# Patient Record
Sex: FEMALE | Race: WHITE | NOT HISPANIC OR LATINO | URBAN - METROPOLITAN AREA
[De-identification: names, ages, dates, MRNs, and addresses within clinical notes are randomized per-mention and may not be internally consistent; named-entity substitution may affect disease eponyms.]

---

## 2020-05-30 ENCOUNTER — EMERGENCY (EMERGENCY)
Facility: HOSPITAL | Age: 30
LOS: 1 days | Discharge: HOME | End: 2020-05-30
Attending: EMERGENCY MEDICINE
Payer: MEDICAID

## 2020-05-30 VITALS
TEMPERATURE: 99 F | RESPIRATION RATE: 16 BRPM | OXYGEN SATURATION: 99 % | HEIGHT: 67 IN | DIASTOLIC BLOOD PRESSURE: 76 MMHG | HEART RATE: 67 BPM | SYSTOLIC BLOOD PRESSURE: 115 MMHG | WEIGHT: 149.91 LBS

## 2020-05-30 PROCEDURE — 73610 X-RAY EXAM OF ANKLE: CPT

## 2020-05-30 PROCEDURE — 99283 EMERGENCY DEPT VISIT LOW MDM: CPT

## 2020-05-30 PROCEDURE — 73610 X-RAY EXAM OF ANKLE: CPT | Mod: 26,RT

## 2020-05-30 RX ORDER — ACETAMINOPHEN 500 MG
975 TABLET ORAL ONCE
Refills: 0 | Status: COMPLETED | OUTPATIENT
Start: 2020-05-30 | End: 2020-05-30

## 2020-05-30 RX ADMIN — Medication 975 MILLIGRAM(S): at 15:32

## 2020-05-30 NOTE — ED PROVIDER NOTE - NSFOLLOWUPCLINICS_GEN_ALL_ED_FT
[facility] Orthopedic [facility]  Orthopedic Surgery  [address], 3rd & 4th [place] [address]  Phone: [phone]  Fax:   Follow Up Time:     [facility]  Orthopedic Surgery  [address] [address]  Phone: [phone]  Fax:   Follow Up Time:

## 2020-05-30 NOTE — ED PROVIDER NOTE - ATTENDING CONTRIBUTION TO CARE
29y female pmh ankle sprain, comes to ed complains of ankle pain sp twisting same, pt had been working out. and complains of pain rt ankle lat, normocephalic atraumatic neck supple chest clear anterior & posterior abd soft +bs no mass guarding, cv no rubs, gallops or murmurs neuro no focal defects., MSK rt ankle lat swelling tender mod/severe rt malleous, distal neuro vasc intact  Dipak Gooden MD, Facep

## 2020-05-30 NOTE — ED ADULT NURSE NOTE - OBJECTIVE STATEMENT
Patient presents to Ed with c/o rt ankle injury. Patient reports injuring her rt ankle today while exercising. Patient  denies any other injuries, patient awaiting x-ray, Tylenol given per Md order.

## 2020-05-30 NOTE — ED PROVIDER NOTE - OBJECTIVE STATEMENT
30yo F with hx of ankle sprain, no other pmh presents s/p ankle injury with ankle pain. Was doing "hiney" work out where she jumps up and down. When she landed, she hurt her R ankle and was unable to bear weight on the R. no neurovascular changes.

## 2020-05-30 NOTE — ED PROVIDER NOTE - NS ED ROS FT
ROS: denies HA, weakness, dizziness, fevers/chills, nausea/vomiting, chest pain, SOB, diaphoresis, abdominal pain, diarrhea, neuro deficits, dysuria/hematuria, rash    +R ankle pain

## 2020-05-30 NOTE — ED PROVIDER NOTE - PATIENT PORTAL LINK FT
You can access the FollowMyHealth Patient Portal offered by [facility] by registering at the following website: [email or web address]. By joining [employer]’s FollowMyHealth portal, you will also be able to view your health information using other applications (apps) compatible with our system.

## 2020-05-30 NOTE — ED ADULT TRIAGE NOTE - AS HEIGHT TYPE
Biometrics completed.    Results reviewed with a Registered Nurse; understanding of results and   educational materials was verbalized.  
stated

## 2020-05-30 NOTE — ED PROVIDER NOTE - NSFOLLOWUPINSTRUCTIONS_ED_ALL_ED_FT
You were seen in the ER for ankle sprain.  Xray did not show any fractures.  Return to ER for life threatening emergencies.  Follow up with your doctor.

## 2020-06-01 NOTE — ED POST DISCHARGE NOTE - DETAILS
Patient placed in air cast and given crutches as well as ortho follow-up. Unclear if patient informed of discrepancy read, as it was released less than one hour after initial read. Attempt to contact patient to ensure remaining nonweightbearing until follow-up with orthopedics. Left VM for patient to call back. -Ifeoma Solis PA-C 6/7/2020 - Called and spoke with patient. Still wearing air cast but reports persistent ankle pain. Informed of results, advised to use crutches and f/u with orthopedic. - Ashley Nicole PA-C

## 2020-06-01 NOTE — ED POST DISCHARGE NOTE - RESULT SUMMARY
Xray ankle: Small ossific fragments projecting at the distal aspect of the fibula are of uncertain chronicity.

## 2020-06-28 ENCOUNTER — EMERGENCY (EMERGENCY)
Facility: HOSPITAL | Age: 30
LOS: 1 days | Discharge: HOME | End: 2020-06-28
Attending: EMERGENCY MEDICINE
Payer: MEDICAID

## 2020-06-28 VITALS
HEART RATE: 70 BPM | OXYGEN SATURATION: 100 % | RESPIRATION RATE: 15 BRPM | DIASTOLIC BLOOD PRESSURE: 77 MMHG | TEMPERATURE: 98 F | SYSTOLIC BLOOD PRESSURE: 122 MMHG

## 2020-06-28 VITALS
RESPIRATION RATE: 18 BRPM | TEMPERATURE: 99 F | HEIGHT: 67 IN | DIASTOLIC BLOOD PRESSURE: 77 MMHG | WEIGHT: 141.98 LBS | SYSTOLIC BLOOD PRESSURE: 116 MMHG | OXYGEN SATURATION: 99 % | HEART RATE: 91 BPM

## 2020-06-28 LAB
ALBUMIN SERPL ELPH-MCNC: 3.6 G/DL — SIGNIFICANT CHANGE UP (ref 3.3–5)
ALP SERPL-CCNC: 66 U/L — SIGNIFICANT CHANGE UP (ref 40–120)
ALT FLD-CCNC: 10 U/L — SIGNIFICANT CHANGE UP (ref 10–45)
ANION GAP SERPL CALC-SCNC: 9 MMOL/L — SIGNIFICANT CHANGE UP (ref 5–17)
APPEARANCE UR: ABNORMAL
AST SERPL-CCNC: 19 U/L — SIGNIFICANT CHANGE UP (ref 10–40)
BACTERIA # UR AUTO: ABNORMAL
BASOPHILS # BLD AUTO: 0.02 K/UL — SIGNIFICANT CHANGE UP (ref 0–0.2)
BASOPHILS NFR BLD AUTO: 0.3 % — SIGNIFICANT CHANGE UP (ref 0–2)
BILIRUB SERPL-MCNC: 0.2 MG/DL — SIGNIFICANT CHANGE UP (ref 0.2–1.2)
BILIRUB UR-MCNC: NEGATIVE — SIGNIFICANT CHANGE UP
BUN SERPL-MCNC: 8 MG/DL — SIGNIFICANT CHANGE UP (ref 7–23)
CALCIUM SERPL-MCNC: 8.3 MG/DL — LOW (ref 8.4–10.5)
CHLORIDE SERPL-SCNC: 103 MMOL/L — SIGNIFICANT CHANGE UP (ref 96–108)
CO2 SERPL-SCNC: 23 MMOL/L — SIGNIFICANT CHANGE UP (ref 22–31)
COLOR SPEC: YELLOW — SIGNIFICANT CHANGE UP
CREAT SERPL-MCNC: 0.66 MG/DL — SIGNIFICANT CHANGE UP (ref 0.5–1.3)
DIFF PNL FLD: NEGATIVE — SIGNIFICANT CHANGE UP
EOSINOPHIL # BLD AUTO: 0.04 K/UL — SIGNIFICANT CHANGE UP (ref 0–0.5)
EOSINOPHIL NFR BLD AUTO: 0.5 % — SIGNIFICANT CHANGE UP (ref 0–6)
EPI CELLS # UR: 5 /HPF — SIGNIFICANT CHANGE UP
GLUCOSE SERPL-MCNC: 95 MG/DL — SIGNIFICANT CHANGE UP (ref 70–99)
GLUCOSE UR QL: NEGATIVE — SIGNIFICANT CHANGE UP
HCG SERPL-ACNC: <2 MIU/ML — SIGNIFICANT CHANGE UP
HCT VFR BLD CALC: 36.5 % — SIGNIFICANT CHANGE UP (ref 34.5–45)
HGB BLD-MCNC: 11.9 G/DL — SIGNIFICANT CHANGE UP (ref 11.5–15.5)
HYALINE CASTS # UR AUTO: 1 /LPF — SIGNIFICANT CHANGE UP (ref 0–2)
IMM GRANULOCYTES NFR BLD AUTO: 0.4 % — SIGNIFICANT CHANGE UP (ref 0–1.5)
KETONES UR-MCNC: NEGATIVE — SIGNIFICANT CHANGE UP
LEUKOCYTE ESTERASE UR-ACNC: NEGATIVE — SIGNIFICANT CHANGE UP
LIDOCAIN IGE QN: 19 U/L — SIGNIFICANT CHANGE UP (ref 7–60)
LYMPHOCYTES # BLD AUTO: 2.17 K/UL — SIGNIFICANT CHANGE UP (ref 1–3.3)
LYMPHOCYTES # BLD AUTO: 27.5 % — SIGNIFICANT CHANGE UP (ref 13–44)
MCHC RBC-ENTMCNC: 28.9 PG — SIGNIFICANT CHANGE UP (ref 27–34)
MCHC RBC-ENTMCNC: 32.6 GM/DL — SIGNIFICANT CHANGE UP (ref 32–36)
MCV RBC AUTO: 88.6 FL — SIGNIFICANT CHANGE UP (ref 80–100)
MONOCYTES # BLD AUTO: 0.87 K/UL — SIGNIFICANT CHANGE UP (ref 0–0.9)
MONOCYTES NFR BLD AUTO: 11 % — SIGNIFICANT CHANGE UP (ref 2–14)
NEUTROPHILS # BLD AUTO: 4.77 K/UL — SIGNIFICANT CHANGE UP (ref 1.8–7.4)
NEUTROPHILS NFR BLD AUTO: 60.3 % — SIGNIFICANT CHANGE UP (ref 43–77)
NITRITE UR-MCNC: NEGATIVE — SIGNIFICANT CHANGE UP
NRBC # BLD: 0 /100 WBCS — SIGNIFICANT CHANGE UP (ref 0–0)
PH UR: 6 — SIGNIFICANT CHANGE UP (ref 5–8)
PLATELET # BLD AUTO: 225 K/UL — SIGNIFICANT CHANGE UP (ref 150–400)
POTASSIUM SERPL-MCNC: 4.1 MMOL/L — SIGNIFICANT CHANGE UP (ref 3.5–5.3)
POTASSIUM SERPL-SCNC: 4.1 MMOL/L — SIGNIFICANT CHANGE UP (ref 3.5–5.3)
PROT SERPL-MCNC: 7.1 G/DL — SIGNIFICANT CHANGE UP (ref 6–8.3)
PROT UR-MCNC: ABNORMAL
RBC # BLD: 4.12 M/UL — SIGNIFICANT CHANGE UP (ref 3.8–5.2)
RBC # FLD: 11.7 % — SIGNIFICANT CHANGE UP (ref 10.3–14.5)
RBC CASTS # UR COMP ASSIST: 3 /HPF — SIGNIFICANT CHANGE UP (ref 0–4)
SODIUM SERPL-SCNC: 135 MMOL/L — SIGNIFICANT CHANGE UP (ref 135–145)
SP GR SPEC: 1.02 — SIGNIFICANT CHANGE UP (ref 1.01–1.02)
UROBILINOGEN FLD QL: NEGATIVE — SIGNIFICANT CHANGE UP
WBC # BLD: 7.9 K/UL — SIGNIFICANT CHANGE UP (ref 3.8–10.5)
WBC # FLD AUTO: 7.9 K/UL — SIGNIFICANT CHANGE UP (ref 3.8–10.5)
WBC UR QL: 2 /HPF — SIGNIFICANT CHANGE UP (ref 0–5)

## 2020-06-28 PROCEDURE — 85027 COMPLETE CBC AUTOMATED: CPT

## 2020-06-28 PROCEDURE — 83690 ASSAY OF LIPASE: CPT

## 2020-06-28 PROCEDURE — 84702 CHORIONIC GONADOTROPIN TEST: CPT

## 2020-06-28 PROCEDURE — 81001 URINALYSIS AUTO W/SCOPE: CPT

## 2020-06-28 PROCEDURE — 99285 EMERGENCY DEPT VISIT HI MDM: CPT

## 2020-06-28 PROCEDURE — 74177 CT ABD & PELVIS W/CONTRAST: CPT

## 2020-06-28 PROCEDURE — 80053 COMPREHEN METABOLIC PANEL: CPT

## 2020-06-28 PROCEDURE — 74177 CT ABD & PELVIS W/CONTRAST: CPT | Mod: 26

## 2020-06-28 PROCEDURE — 99284 EMERGENCY DEPT VISIT MOD MDM: CPT | Mod: 25

## 2020-06-28 PROCEDURE — 96374 THER/PROPH/DIAG INJ IV PUSH: CPT | Mod: XU

## 2020-06-28 PROCEDURE — 96361 HYDRATE IV INFUSION ADD-ON: CPT

## 2020-06-28 RX ORDER — MULTIVIT WITH MIN/MFOLATE/K2 340-15/3 G
1 POWDER (GRAM) ORAL ONCE
Refills: 0 | Status: COMPLETED | OUTPATIENT
Start: 2020-06-28 | End: 2020-06-28

## 2020-06-28 RX ORDER — SODIUM CHLORIDE 9 MG/ML
1000 INJECTION INTRAMUSCULAR; INTRAVENOUS; SUBCUTANEOUS ONCE
Refills: 0 | Status: COMPLETED | OUTPATIENT
Start: 2020-06-28 | End: 2020-06-28

## 2020-06-28 RX ORDER — ONDANSETRON 8 MG/1
4 TABLET, FILM COATED ORAL ONCE
Refills: 0 | Status: COMPLETED | OUTPATIENT
Start: 2020-06-28 | End: 2020-06-28

## 2020-06-28 RX ADMIN — SODIUM CHLORIDE 1000 MILLILITER(S): 9 INJECTION INTRAMUSCULAR; INTRAVENOUS; SUBCUTANEOUS at 05:54

## 2020-06-28 RX ADMIN — Medication 1 BOTTLE: at 07:10

## 2020-06-28 RX ADMIN — SODIUM CHLORIDE 1000 MILLILITER(S): 9 INJECTION INTRAMUSCULAR; INTRAVENOUS; SUBCUTANEOUS at 05:15

## 2020-06-28 RX ADMIN — ONDANSETRON 4 MILLIGRAM(S): 8 TABLET, FILM COATED ORAL at 05:14

## 2020-06-28 NOTE — ED PROVIDER NOTE - NS ED ROS FT
ROS:  GENERAL: +fever   EYES: no change in vision  HEENT: no trouble swallowing, no trouble speaking  CARDIAC: no chest pain  PULMONARY: no cough, no shortness of breath  GI: +abd pain and nausea. no vomiting, no diarrhea, no constipation  : No dysuria, no frequency, no change in appearance, or odor of urine  SKIN: no rashes  NEURO: no headache, no weakness  MSK: No joint pain    Eh Friere PGY2

## 2020-06-28 NOTE — ED PROVIDER NOTE - NSFOLLOWUPINSTRUCTIONS_ED_ALL_ED_FT
Follow up with your PCP in 24-48 hours.   Drink 1 bottle of mag citrate upon returning home.   Return to the ER if you develop any new or worsening symptoms such as fever, chest pain, shortness of breath, numbness, weakness, abdominal pain, nausea, vomiting, or visual changes. Follow up with your PCP in 24-48 hours. Please bring copies of your results with you.   Drink 1 bottle of mag citrate upon returning home.   Return to the ER if you develop any new or worsening symptoms such as fever, chest pain, shortness of breath, numbness, weakness, abdominal pain, nausea, vomiting, or visual changes.  - Lab and imaging results, if performed, were discussed with you along with your discharge diagnosis    - Continue all prescribed medications    - Take tylenol as directed as needed for pain    - Rest and keep yourself hydrated with fluids

## 2020-06-28 NOTE — ED ADULT TRIAGE NOTE - CHIEF COMPLAINT QUOTE
abdominal pain x 3 days; no bm; tried Miralax; nausea; feels bloated; temp 100.5 at home with chills

## 2020-06-28 NOTE — ED PROVIDER NOTE - PROGRESS NOTE DETAILS
Amanda COLEMAN (PGY-1): Patient signed out to me, pending CTr, Mag citrate, dispo. Resting comfortably in bed, not endorsing pain at this time. Will continue to monitor. Amanda COLEMAN (PGY-1): Results of CT discussed with patient, labs wnl, no clinical suspicion for autoimmune pancreatitis as pt's pain in LLQ, patient states she will f/u with her PCP.

## 2020-06-28 NOTE — ED PROVIDER NOTE - OBJECTIVE STATEMENT
29F with no PMH/PSH p/w constant left-sided abdominal pain and constipation x 3 days. Associated with nausea and subjective fevers (100.5 at home). +flatus. Denies any other symptoms including vomiting, cough, chest pain, SOB, urinary symptoms. Went to an urgent care today where she was tested for COVID but states they did not evaluate her abdominal pain. LMP 3 w/a.

## 2020-06-28 NOTE — ED PROVIDER NOTE - PATIENT PORTAL LINK FT
You can access the FollowMyHealth Patient Portal offered by [facility] by registering at the following website: [email or web address]. By joining [employer]’s FollowMyHealth portal, you will also be able to view your health information using other applications (apps) compatible with our system.

## 2020-06-28 NOTE — ED PROVIDER NOTE - PHYSICAL EXAMINATION
Gen: AAOx3, non-toxic  Head: NCAT  HEENT: EOMI, oral mucosa moist, normal conjunctiva  Lung: CTAB, no respiratory distress, no wheezes/rhonchi/rales B/L, speaking in full sentences  CV: RRR, no murmurs, rubs or gallops  Abd: soft, mild TTP in the LLQ/LUQ, negative david's, no RLE TTP, no guarding, no CVA tenderness  MSK: no visible deformities  Neuro: No focal sensory or motor deficits, normal CN exam   Skin: Warm, well perfused, no rash  Psych: normal affect.     ~Eh Freire PGY2

## 2020-06-28 NOTE — ED PROVIDER NOTE - ATTENDING CONTRIBUTION TO CARE
MD Ann:  patient seen and evaluated personally.   I agree with the History & Physical,  Impression & Plan other than what was detailed in my note.  MD Ann    28 y/o f no hx of abd surgery presenting to ed w/ cc of 3 days of epigastric abd pain, non radiating, persistent, made better by eating, has not had before. no associated vomiting, diarrhea.pt does report constipation over past couple of days. pt reports temp of 100.5. no cp, sob, palp, ha, bv, afebrile vitals stable non toxic, well appearing pos epigastric ttp no ruq ttp, neg david's, mild llq ttp. possibly gastritis vs constipation however will get screening labs, cbc, cmp, lipase, urinalysis, preg test r/o ectopic. will give pain meds and re eval .

## 2020-06-28 NOTE — ED PROVIDER NOTE - CLINICAL SUMMARY MEDICAL DECISION MAKING FREE TEXT BOX
Left-sided abd pain, subjective fevers, and constipation x 3 days. +Flatus. No vomiting, cough, SOB, urinary symptoms. Pt well appearing, HD stable, only mildly tender in the LLQ/LUQ without rebound/guarding. Low suspicion for acute surgical abdomen. Will assess basic labs, UA, Upreg, then determine need for further evaluation.

## 2020-06-28 NOTE — ED ADULT NURSE NOTE - CAS EDN DISCHARGE ASSESSMENT
Pt arrives via triage c/o chest pressure that started on Thursday and became worse last night. Pt has had similar episodes which were attributed to stress.   
Alert and oriented to person, place and time/Awake/Patient baseline mental status

## 2020-06-28 NOTE — ED ADULT NURSE REASSESSMENT NOTE - NS ED NURSE REASSESS COMMENT FT1
Report received from Myriam HELMS in gold. Patient resting comfortably in bed. Awaiting CTr and dispo. Medication given, education provided on mag citrate. Patient verbalized understanding. Safety and comfort provided.

## 2020-06-30 ENCOUNTER — EMERGENCY (EMERGENCY)
Facility: HOSPITAL | Age: 30
LOS: 1 days | Discharge: HOME | End: 2020-06-30
Attending: EMERGENCY MEDICINE
Payer: MEDICAID

## 2020-06-30 VITALS
HEIGHT: 67 IN | HEART RATE: 94 BPM | TEMPERATURE: 99 F | WEIGHT: 145.06 LBS | DIASTOLIC BLOOD PRESSURE: 69 MMHG | SYSTOLIC BLOOD PRESSURE: 105 MMHG | RESPIRATION RATE: 18 BRPM | OXYGEN SATURATION: 99 %

## 2020-06-30 VITALS
DIASTOLIC BLOOD PRESSURE: 78 MMHG | SYSTOLIC BLOOD PRESSURE: 112 MMHG | HEART RATE: 68 BPM | TEMPERATURE: 98 F | RESPIRATION RATE: 17 BRPM | OXYGEN SATURATION: 100 %

## 2020-06-30 LAB
ALBUMIN SERPL ELPH-MCNC: 3.8 G/DL — SIGNIFICANT CHANGE UP (ref 3.3–5)
ALP SERPL-CCNC: 65 U/L — SIGNIFICANT CHANGE UP (ref 40–120)
ALT FLD-CCNC: 16 U/L — SIGNIFICANT CHANGE UP (ref 10–45)
ANION GAP SERPL CALC-SCNC: 9 MMOL/L — SIGNIFICANT CHANGE UP (ref 5–17)
APPEARANCE UR: ABNORMAL
APPEARANCE UR: CLEAR — SIGNIFICANT CHANGE UP
AST SERPL-CCNC: 19 U/L — SIGNIFICANT CHANGE UP (ref 10–40)
BACTERIA # UR AUTO: ABNORMAL
BACTERIA # UR AUTO: ABNORMAL
BASOPHILS # BLD AUTO: 0.01 K/UL — SIGNIFICANT CHANGE UP (ref 0–0.2)
BASOPHILS NFR BLD AUTO: 0.2 % — SIGNIFICANT CHANGE UP (ref 0–2)
BILIRUB SERPL-MCNC: 0.1 MG/DL — LOW (ref 0.2–1.2)
BILIRUB UR-MCNC: NEGATIVE — SIGNIFICANT CHANGE UP
BILIRUB UR-MCNC: NEGATIVE — SIGNIFICANT CHANGE UP
BUN SERPL-MCNC: 13 MG/DL — SIGNIFICANT CHANGE UP (ref 7–23)
CALCIUM SERPL-MCNC: 8.7 MG/DL — SIGNIFICANT CHANGE UP (ref 8.4–10.5)
CHLORIDE SERPL-SCNC: 101 MMOL/L — SIGNIFICANT CHANGE UP (ref 96–108)
CO2 SERPL-SCNC: 25 MMOL/L — SIGNIFICANT CHANGE UP (ref 22–31)
COLOR SPEC: SIGNIFICANT CHANGE UP
COLOR SPEC: YELLOW — SIGNIFICANT CHANGE UP
CREAT SERPL-MCNC: 0.79 MG/DL — SIGNIFICANT CHANGE UP (ref 0.5–1.3)
DIFF PNL FLD: ABNORMAL
DIFF PNL FLD: NEGATIVE — SIGNIFICANT CHANGE UP
EOSINOPHIL # BLD AUTO: 0 K/UL — SIGNIFICANT CHANGE UP (ref 0–0.5)
EOSINOPHIL NFR BLD AUTO: 0 % — SIGNIFICANT CHANGE UP (ref 0–6)
EPI CELLS # UR: 4 /HPF — SIGNIFICANT CHANGE UP
EPI CELLS # UR: 7 /HPF — HIGH
GAS PNL BLDV: SIGNIFICANT CHANGE UP
GLUCOSE SERPL-MCNC: 92 MG/DL — SIGNIFICANT CHANGE UP (ref 70–99)
GLUCOSE UR QL: NEGATIVE — SIGNIFICANT CHANGE UP
GLUCOSE UR QL: NEGATIVE — SIGNIFICANT CHANGE UP
HCT VFR BLD CALC: 37.5 % — SIGNIFICANT CHANGE UP (ref 34.5–45)
HGB BLD-MCNC: 12.1 G/DL — SIGNIFICANT CHANGE UP (ref 11.5–15.5)
HYALINE CASTS # UR AUTO: 2 /LPF — SIGNIFICANT CHANGE UP (ref 0–2)
HYALINE CASTS # UR AUTO: 9 /LPF — HIGH (ref 0–2)
IMM GRANULOCYTES NFR BLD AUTO: 0.2 % — SIGNIFICANT CHANGE UP (ref 0–1.5)
KETONES UR-MCNC: NEGATIVE — SIGNIFICANT CHANGE UP
KETONES UR-MCNC: NEGATIVE — SIGNIFICANT CHANGE UP
LEUKOCYTE ESTERASE UR-ACNC: ABNORMAL
LEUKOCYTE ESTERASE UR-ACNC: NEGATIVE — SIGNIFICANT CHANGE UP
LIDOCAIN IGE QN: 21 U/L — SIGNIFICANT CHANGE UP (ref 7–60)
LYMPHOCYTES # BLD AUTO: 1.7 K/UL — SIGNIFICANT CHANGE UP (ref 1–3.3)
LYMPHOCYTES # BLD AUTO: 31.5 % — SIGNIFICANT CHANGE UP (ref 13–44)
MCHC RBC-ENTMCNC: 28.7 PG — SIGNIFICANT CHANGE UP (ref 27–34)
MCHC RBC-ENTMCNC: 32.3 GM/DL — SIGNIFICANT CHANGE UP (ref 32–36)
MCV RBC AUTO: 88.9 FL — SIGNIFICANT CHANGE UP (ref 80–100)
MONOCYTES # BLD AUTO: 0.58 K/UL — SIGNIFICANT CHANGE UP (ref 0–0.9)
MONOCYTES NFR BLD AUTO: 10.7 % — SIGNIFICANT CHANGE UP (ref 2–14)
NEUTROPHILS # BLD AUTO: 3.1 K/UL — SIGNIFICANT CHANGE UP (ref 1.8–7.4)
NEUTROPHILS NFR BLD AUTO: 57.4 % — SIGNIFICANT CHANGE UP (ref 43–77)
NITRITE UR-MCNC: NEGATIVE — SIGNIFICANT CHANGE UP
NITRITE UR-MCNC: NEGATIVE — SIGNIFICANT CHANGE UP
NRBC # BLD: 0 /100 WBCS — SIGNIFICANT CHANGE UP (ref 0–0)
PH UR: 6 — SIGNIFICANT CHANGE UP (ref 5–8)
PH UR: 6.5 — SIGNIFICANT CHANGE UP (ref 5–8)
PLATELET # BLD AUTO: 249 K/UL — SIGNIFICANT CHANGE UP (ref 150–400)
POTASSIUM SERPL-MCNC: 4.2 MMOL/L — SIGNIFICANT CHANGE UP (ref 3.5–5.3)
POTASSIUM SERPL-SCNC: 4.2 MMOL/L — SIGNIFICANT CHANGE UP (ref 3.5–5.3)
PROT SERPL-MCNC: 8 G/DL — SIGNIFICANT CHANGE UP (ref 6–8.3)
PROT UR-MCNC: ABNORMAL
PROT UR-MCNC: NEGATIVE — SIGNIFICANT CHANGE UP
RBC # BLD: 4.22 M/UL — SIGNIFICANT CHANGE UP (ref 3.8–5.2)
RBC # FLD: 11.7 % — SIGNIFICANT CHANGE UP (ref 10.3–14.5)
RBC CASTS # UR COMP ASSIST: 1 /HPF — SIGNIFICANT CHANGE UP (ref 0–4)
RBC CASTS # UR COMP ASSIST: 2 /HPF — SIGNIFICANT CHANGE UP (ref 0–4)
SARS-COV-2 RNA SPEC QL NAA+PROBE: SIGNIFICANT CHANGE UP
SODIUM SERPL-SCNC: 135 MMOL/L — SIGNIFICANT CHANGE UP (ref 135–145)
SP GR SPEC: 1.01 — SIGNIFICANT CHANGE UP (ref 1.01–1.02)
SP GR SPEC: 1.03 — HIGH (ref 1.01–1.02)
UROBILINOGEN FLD QL: NEGATIVE — SIGNIFICANT CHANGE UP
UROBILINOGEN FLD QL: NEGATIVE — SIGNIFICANT CHANGE UP
WBC # BLD: 5.4 K/UL — SIGNIFICANT CHANGE UP (ref 3.8–10.5)
WBC # FLD AUTO: 5.4 K/UL — SIGNIFICANT CHANGE UP (ref 3.8–10.5)
WBC UR QL: 11 /HPF — HIGH (ref 0–5)
WBC UR QL: 3 /HPF — SIGNIFICANT CHANGE UP (ref 0–5)

## 2020-06-30 PROCEDURE — 84132 ASSAY OF SERUM POTASSIUM: CPT

## 2020-06-30 PROCEDURE — 87086 URINE CULTURE/COLONY COUNT: CPT

## 2020-06-30 PROCEDURE — 76830 TRANSVAGINAL US NON-OB: CPT

## 2020-06-30 PROCEDURE — 85027 COMPLETE CBC AUTOMATED: CPT

## 2020-06-30 PROCEDURE — 76830 TRANSVAGINAL US NON-OB: CPT | Mod: 26

## 2020-06-30 PROCEDURE — 93975 VASCULAR STUDY: CPT

## 2020-06-30 PROCEDURE — 99285 EMERGENCY DEPT VISIT HI MDM: CPT

## 2020-06-30 PROCEDURE — 76856 US EXAM PELVIC COMPLETE: CPT

## 2020-06-30 PROCEDURE — 82803 BLOOD GASES ANY COMBINATION: CPT

## 2020-06-30 PROCEDURE — 83605 ASSAY OF LACTIC ACID: CPT

## 2020-06-30 PROCEDURE — 96374 THER/PROPH/DIAG INJ IV PUSH: CPT

## 2020-06-30 PROCEDURE — 85014 HEMATOCRIT: CPT

## 2020-06-30 PROCEDURE — 84295 ASSAY OF SERUM SODIUM: CPT

## 2020-06-30 PROCEDURE — 74018 RADEX ABDOMEN 1 VIEW: CPT

## 2020-06-30 PROCEDURE — 76856 US EXAM PELVIC COMPLETE: CPT | Mod: 26

## 2020-06-30 PROCEDURE — 82947 ASSAY GLUCOSE BLOOD QUANT: CPT

## 2020-06-30 PROCEDURE — 80053 COMPREHEN METABOLIC PANEL: CPT

## 2020-06-30 PROCEDURE — 82435 ASSAY OF BLOOD CHLORIDE: CPT

## 2020-06-30 PROCEDURE — 99284 EMERGENCY DEPT VISIT MOD MDM: CPT | Mod: 25

## 2020-06-30 PROCEDURE — 82330 ASSAY OF CALCIUM: CPT

## 2020-06-30 PROCEDURE — 82150 ASSAY OF AMYLASE: CPT

## 2020-06-30 PROCEDURE — 81001 URINALYSIS AUTO W/SCOPE: CPT

## 2020-06-30 PROCEDURE — 83690 ASSAY OF LIPASE: CPT

## 2020-06-30 PROCEDURE — 93976 VASCULAR STUDY: CPT | Mod: 26,59

## 2020-06-30 PROCEDURE — 74018 RADEX ABDOMEN 1 VIEW: CPT | Mod: 26

## 2020-06-30 RX ORDER — FAMOTIDINE 10 MG/ML
20 INJECTION INTRAVENOUS ONCE
Refills: 0 | Status: COMPLETED | OUTPATIENT
Start: 2020-06-30 | End: 2020-06-30

## 2020-06-30 RX ORDER — MULTIVIT WITH MIN/MFOLATE/K2 340-15/3 G
1 POWDER (GRAM) ORAL ONCE
Refills: 0 | Status: COMPLETED | OUTPATIENT
Start: 2020-06-30 | End: 2020-06-30

## 2020-06-30 RX ORDER — ACETAMINOPHEN 500 MG
975 TABLET ORAL ONCE
Refills: 0 | Status: COMPLETED | OUTPATIENT
Start: 2020-06-30 | End: 2020-06-30

## 2020-06-30 RX ADMIN — Medication 30 MILLILITER(S): at 16:47

## 2020-06-30 RX ADMIN — FAMOTIDINE 20 MILLIGRAM(S): 10 INJECTION INTRAVENOUS at 16:47

## 2020-06-30 RX ADMIN — Medication 975 MILLIGRAM(S): at 16:48

## 2020-06-30 RX ADMIN — Medication 1 BOTTLE: at 20:13

## 2020-06-30 NOTE — ED PROVIDER NOTE - PROGRESS NOTE DETAILS
Blood work unremarkable. ?UTI. Pt reports some improvement in pain. Pelvic exam WNL. Plan for pelvic US given otherwise normal w/u, mild pain, and pelvic ascites on previous CT. Will re-eval. - Ashley Nicole PA-C Blood work unremarkable. ?UA contaminated, will repeat. Pt reports some improvement in pain. Pelvic exam WNL. Plan for pelvic US given otherwise normal w/u, mild persistent pain, and pelvic ascites on previous CT 2 days ago. Will re-eval. - Ashley Nicole PA-C Attending MD Estrada: Call from radiology re: US, +mild complex fluid, ?ruptured hemorrhagic cyst.  Ovaries with good flow, HCG neg.  Patient feeling improved.  Labs and UA nonactionable.  XR no obstructive pattern.  Discussed labs/imaging with patient.  Reports has had ruptured ovarian cysts in the past.  Reports feels improved.  Declined CDU at this time.  Stable for discharge. Follow up instructions given, importance of follow up emphasized, return to ED parameters reviewed and patient verbalized understanding.  All questions answered, all concerns addressed.

## 2020-06-30 NOTE — ED ADULT NURSE NOTE - CHPI ED NUR SYMPTOMS NEG
no vomiting/no fever/no blood in stool/no chills/no abdominal distension/no hematuria/no dysuria/no diarrhea/no nausea

## 2020-06-30 NOTE — ED ADULT NURSE REASSESSMENT NOTE - NS ED NURSE REASSESS COMMENT FT1
Pt reports abdominal pain relief after medication administration. Pt reports "I'm able to lay on my left side, I haven't been able to do that because of the pain." Pending dispo

## 2020-06-30 NOTE — ED PROVIDER NOTE - ATTENDING CONTRIBUTION TO CARE
Attending MD Estrada:   I personally have seen and examined this patient.  Physician assistant note reviewed and agree on plan of care and except where noted.  See below for details.     Seen in [place] [facility]    29F with no reported PMH/PSH presents to the ED with abdominal pain since Thursday 6/25/20.  Reports that the pain diffuse mild, worse in L side of abdomen, associated constipation.  Reports had BM on 6/28/20 after enema.  Reports no BM since then.  Reports presented today for abdominal pain, L sided and also epigastric.  Denies radiating.  Denies bloody, dark stools.  Reports also had temp starting on Thursday, reports Tmax 100.5.  Denies dysuria, hematuria, change in urinary habits including frequency, urgency. Denies chest pain, shortness of breath, palpitations, cough.  Denies dizziness, weakness. Denies known COVID or COVID contacts.  Denies EtOH, tobacco, drugs.  LMP 3 weeks ago, reports very regular.  Denies vaginal discharge, abnormal vaginal bleeding.  A ten (10) point review of systems was negative other than as stated in the HPI or elsewhere in the chart.     Exam:   General: NAD  HENT: head NCAT, airway patent with moist mucous membranes  Eyes: PERRL  Lungs: lungs CTAB with good inspiratory effort, no wheezing, no rhonchi, no rales  Cardiac: +S1S2, no m/r/g  GI: abdomen soft with +BS, mild epigastric and LLQ tenderness to palpation, no rebound, no guarding, ND  : no CVAT, pelvic as per LESLIE Nicole's documentation  MSK: FROM at neck, no tenderness to midline palpation, no stepoffs along length of spine, no calf tenderness, swelling, erythema or warmth  Neuro: moving all extremities with 5/5 strength bilateral upper and lower extremities, good and equal  strength bilaterally, sensory grossly intact, no gross neuro deficits  Psych: normal mood and affect   Skin: Warm and dry, no rash    A/P: 29F with abdominal pain, return visit, will repeat labs for possible metabolic disturbance, for renal or liver dysfunction, given lower L abdominal pain, will obtain US pelvis, will obtain XR abdomen, low suspicion for obstruction, will give MgCitrate, differential also includes gastritis, will give GI cocktail, will reassess

## 2020-06-30 NOTE — ED ADULT NURSE NOTE - OBJECTIVE STATEMENT
29 y.o. female discharged from ED on Saturday 6/27 for constipation presents to ED c/o abdominal pain x4 days. Pt states the abdominal pain is sharp in epigastric area and radiates toward umbilicus and the L side. Pt states she was advised to use enema for constipation after discharge from ED, and had a large amount of nonbloody loose stool. Pt reports pain started shortly after that. Pt reports decreased appetite due to pain but has been tolerating PO. Pt reports no BM since enema, but reports positive flatulence. Pt reports associated H/A that radiates towards neck. Pt denies N/V/D, dysuria, blood in urine, CP, SOB, fevers, chills, cough, dizziness, lightheadedness, weakness, daily ETOH. Upon assessment, pt A&Ox3, ambulated to stretcher, NAD, no increased WOB noted, no diaphoresis noted, skin warm and appropriate color, tenderness reported upon abdominal palpation, no abdominal distention noted. Pt safety and comfort provided.

## 2020-06-30 NOTE — ED PROVIDER NOTE - NSFOLLOWUPINSTRUCTIONS_ED_ALL_ED_FT
You were seen in the Emergency Department for abdominal pain.  You had lab work which was reassuring and an US of your pelvis which showed mild complex fluid but no clear source was identified.  It is possible this is from a ruptured hemorrhagic cyst.  Please call your gynecologist this week to set up follow up.  You also had an Xray of your abdomen and were given magnesium citrate to help you move your bowels.      No signs of emergency medical condition on today's workup.  Presumptive diagnosis made of ruptured cyst, but further evaluation may be required by your primary care doctor or specialist for a definitive diagnosis.  Therefore, follow up as directed and if symptoms change/worsen or any emergency conditions, please return to the ER.     You should also follow up with your primary care doctor this week.

## 2020-06-30 NOTE — ED PROVIDER NOTE - PATIENT PORTAL LINK FT
You can access the FollowMyHealth Patient Portal offered by [facility] by registering at the following website: [email or web address]. By joining [employer]’s FollowMyHealth portal, you will also be able to view your health information using other applications (apps) compatible with our system.

## 2020-06-30 NOTE — ED ADULT NURSE NOTE - CHIEF COMPLAINT QUOTE
discharged on Saturday from [facility] for constipation  epigastric pain x4 days

## 2020-06-30 NOTE — ED PROVIDER NOTE - OBJECTIVE STATEMENT
30yo F with no PMH, presenting with abdominal pain x 6 days. Pt reports mild diffuse abdominal pain L>R that began 6 days ago with associated constipation. Reports constipation for 3 days until she came to ED on 6/28/2020. Labs WNL at the time and CT revealing pancreas inflammation in setting of normal lipase and no epigastric pain on exam. Pt reports using enema that night with large nonbloody BM. No BM since. Pt now reporting persistent, constant, sharp epigastric and L-sided abdominal pain. Pain is slightly better at rest and better with eating but then returns.  Reports fever to 100.5F 3 days ago, none since. Reports social alcohol use, no daily drinking. Denies smoking, fever/chills, CP, SOB, n/v/d, melena, BRBPR, urinary symptoms, abnl vaginal discharge, h/o abd surgeries. LMP 3 weeks ago. No previous hx of constipation. No hx of endoscopy/colonoscopy.

## 2020-06-30 NOTE — ED ADULT TRIAGE NOTE - CHIEF COMPLAINT QUOTE
discharged on Saturday from [facility] for constipation  epigastric pain x4 days

## 2020-07-01 PROBLEM — Z00.00 ENCOUNTER FOR PREVENTIVE HEALTH EXAMINATION: Status: ACTIVE | Noted: 2020-07-01

## 2020-07-01 LAB
CULTURE RESULTS: SIGNIFICANT CHANGE UP
CULTURE RESULTS: SIGNIFICANT CHANGE UP
SPECIMEN SOURCE: SIGNIFICANT CHANGE UP
SPECIMEN SOURCE: SIGNIFICANT CHANGE UP

## 2020-07-07 ENCOUNTER — APPOINTMENT (OUTPATIENT)
Dept: OBGYN | Facility: CLINIC | Age: 30
End: 2020-07-07

## 2021-10-04 ENCOUNTER — INPATIENT (INPATIENT)
Facility: HOSPITAL | Age: 31
LOS: 1 days | Discharge: HOME | End: 2021-10-06
Attending: OBSTETRICS & GYNECOLOGY | Admitting: OBSTETRICS & GYNECOLOGY
Payer: COMMERCIAL

## 2021-10-04 VITALS — SYSTOLIC BLOOD PRESSURE: 137 MMHG | HEART RATE: 78 BPM | TEMPERATURE: 99 F | DIASTOLIC BLOOD PRESSURE: 74 MMHG

## 2021-10-04 DIAGNOSIS — Z3A.00 WEEKS OF GESTATION OF PREGNANCY NOT SPECIFIED: ICD-10-CM

## 2021-10-04 DIAGNOSIS — O26.899 OTHER SPECIFIED PREGNANCY RELATED CONDITIONS, UNSPECIFIED TRIMESTER: ICD-10-CM

## 2021-10-04 DIAGNOSIS — Z3A.37 37 WEEKS GESTATION OF PREGNANCY: ICD-10-CM

## 2021-10-04 DIAGNOSIS — Z34.80 ENCOUNTER FOR SUPERVISION OF OTHER NORMAL PREGNANCY, UNSPECIFIED TRIMESTER: ICD-10-CM

## 2021-10-04 LAB
BASOPHILS # BLD AUTO: 0.03 K/UL — SIGNIFICANT CHANGE UP (ref 0–0.2)
BASOPHILS NFR BLD AUTO: 0.3 % — SIGNIFICANT CHANGE UP (ref 0–2)
BLD GP AB SCN SERPL QL: NEGATIVE — SIGNIFICANT CHANGE UP
EOSINOPHIL # BLD AUTO: 0.05 K/UL — SIGNIFICANT CHANGE UP (ref 0–0.5)
EOSINOPHIL NFR BLD AUTO: 0.5 % — SIGNIFICANT CHANGE UP (ref 0–6)
HCT VFR BLD CALC: 32 % — LOW (ref 34.5–45)
HGB BLD-MCNC: 9.9 G/DL — LOW (ref 11.5–15.5)
IMM GRANULOCYTES NFR BLD AUTO: 1.1 % — SIGNIFICANT CHANGE UP (ref 0–1.5)
LYMPHOCYTES # BLD AUTO: 2.72 K/UL — SIGNIFICANT CHANGE UP (ref 1–3.3)
LYMPHOCYTES # BLD AUTO: 25.8 % — SIGNIFICANT CHANGE UP (ref 13–44)
MCHC RBC-ENTMCNC: 24.8 PG — LOW (ref 27–34)
MCHC RBC-ENTMCNC: 30.9 GM/DL — LOW (ref 32–36)
MCV RBC AUTO: 80 FL — SIGNIFICANT CHANGE UP (ref 80–100)
MONOCYTES # BLD AUTO: 0.86 K/UL — SIGNIFICANT CHANGE UP (ref 0–0.9)
MONOCYTES NFR BLD AUTO: 8.1 % — SIGNIFICANT CHANGE UP (ref 2–14)
NEUTROPHILS # BLD AUTO: 6.78 K/UL — SIGNIFICANT CHANGE UP (ref 1.8–7.4)
NEUTROPHILS NFR BLD AUTO: 64.2 % — SIGNIFICANT CHANGE UP (ref 43–77)
NRBC # BLD: 0 /100 WBCS — SIGNIFICANT CHANGE UP (ref 0–0)
PLATELET # BLD AUTO: 272 K/UL — SIGNIFICANT CHANGE UP (ref 150–400)
RBC # BLD: 4 M/UL — SIGNIFICANT CHANGE UP (ref 3.8–5.2)
RBC # FLD: 14.6 % — HIGH (ref 10.3–14.5)
RH IG SCN BLD-IMP: POSITIVE — SIGNIFICANT CHANGE UP
RH IG SCN BLD-IMP: POSITIVE — SIGNIFICANT CHANGE UP
SARS-COV-2 RNA SPEC QL NAA+PROBE: SIGNIFICANT CHANGE UP
WBC # BLD: 10.56 K/UL — HIGH (ref 3.8–10.5)
WBC # FLD AUTO: 10.56 K/UL — HIGH (ref 3.8–10.5)

## 2021-10-04 RX ORDER — BENZOCAINE 10 %
1 GEL (GRAM) MUCOUS MEMBRANE EVERY 6 HOURS
Refills: 0 | Status: DISCONTINUED | OUTPATIENT
Start: 2021-10-04 | End: 2021-10-06

## 2021-10-04 RX ORDER — OXYTOCIN 10 UNIT/ML
333.33 VIAL (ML) INJECTION
Qty: 20 | Refills: 0 | Status: DISCONTINUED | OUTPATIENT
Start: 2021-10-04 | End: 2021-10-06

## 2021-10-04 RX ORDER — TETANUS TOXOID, REDUCED DIPHTHERIA TOXOID AND ACELLULAR PERTUSSIS VACCINE, ADSORBED 5; 2.5; 8; 8; 2.5 [IU]/.5ML; [IU]/.5ML; UG/.5ML; UG/.5ML; UG/.5ML
0.5 SUSPENSION INTRAMUSCULAR ONCE
Refills: 0 | Status: DISCONTINUED | OUTPATIENT
Start: 2021-10-04 | End: 2021-10-06

## 2021-10-04 RX ORDER — OXYTOCIN 10 UNIT/ML
4 VIAL (ML) INJECTION
Qty: 30 | Refills: 0 | Status: DISCONTINUED | OUTPATIENT
Start: 2021-10-04 | End: 2021-10-06

## 2021-10-04 RX ORDER — LANOLIN
1 OINTMENT (GRAM) TOPICAL EVERY 6 HOURS
Refills: 0 | Status: DISCONTINUED | OUTPATIENT
Start: 2021-10-04 | End: 2021-10-06

## 2021-10-04 RX ORDER — CITRIC ACID/SODIUM CITRATE 300-500 MG
15 SOLUTION, ORAL ORAL EVERY 6 HOURS
Refills: 0 | Status: DISCONTINUED | OUTPATIENT
Start: 2021-10-04 | End: 2021-10-05

## 2021-10-04 RX ORDER — DIBUCAINE 1 %
1 OINTMENT (GRAM) RECTAL EVERY 6 HOURS
Refills: 0 | Status: DISCONTINUED | OUTPATIENT
Start: 2021-10-04 | End: 2021-10-06

## 2021-10-04 RX ORDER — OXYCODONE HYDROCHLORIDE 5 MG/1
5 TABLET ORAL
Refills: 0 | Status: DISCONTINUED | OUTPATIENT
Start: 2021-10-04 | End: 2021-10-06

## 2021-10-04 RX ORDER — KETOROLAC TROMETHAMINE 30 MG/ML
30 SYRINGE (ML) INJECTION ONCE
Refills: 0 | Status: DISCONTINUED | OUTPATIENT
Start: 2021-10-04 | End: 2021-10-04

## 2021-10-04 RX ORDER — SODIUM CHLORIDE 9 MG/ML
1000 INJECTION, SOLUTION INTRAVENOUS
Refills: 0 | Status: DISCONTINUED | OUTPATIENT
Start: 2021-10-04 | End: 2021-10-05

## 2021-10-04 RX ORDER — MAGNESIUM HYDROXIDE 400 MG/1
30 TABLET, CHEWABLE ORAL
Refills: 0 | Status: DISCONTINUED | OUTPATIENT
Start: 2021-10-04 | End: 2021-10-06

## 2021-10-04 RX ORDER — OXYCODONE HYDROCHLORIDE 5 MG/1
5 TABLET ORAL ONCE
Refills: 0 | Status: DISCONTINUED | OUTPATIENT
Start: 2021-10-04 | End: 2021-10-06

## 2021-10-04 RX ORDER — HYDROCORTISONE 1 %
1 OINTMENT (GRAM) TOPICAL EVERY 6 HOURS
Refills: 0 | Status: DISCONTINUED | OUTPATIENT
Start: 2021-10-04 | End: 2021-10-06

## 2021-10-04 RX ORDER — PRAMOXINE HYDROCHLORIDE 150 MG/15G
1 AEROSOL, FOAM RECTAL EVERY 4 HOURS
Refills: 0 | Status: DISCONTINUED | OUTPATIENT
Start: 2021-10-04 | End: 2021-10-06

## 2021-10-04 RX ORDER — ACETAMINOPHEN 500 MG
975 TABLET ORAL
Refills: 0 | Status: DISCONTINUED | OUTPATIENT
Start: 2021-10-04 | End: 2021-10-06

## 2021-10-04 RX ORDER — AER TRAVELER 0.5 G/1
1 SOLUTION RECTAL; TOPICAL EVERY 4 HOURS
Refills: 0 | Status: DISCONTINUED | OUTPATIENT
Start: 2021-10-04 | End: 2021-10-06

## 2021-10-04 RX ORDER — SIMETHICONE 80 MG/1
80 TABLET, CHEWABLE ORAL EVERY 4 HOURS
Refills: 0 | Status: DISCONTINUED | OUTPATIENT
Start: 2021-10-04 | End: 2021-10-06

## 2021-10-04 RX ORDER — IBUPROFEN 200 MG
600 TABLET ORAL EVERY 6 HOURS
Refills: 0 | Status: COMPLETED | OUTPATIENT
Start: 2021-10-04 | End: 2022-09-02

## 2021-10-04 RX ORDER — SODIUM CHLORIDE 9 MG/ML
3 INJECTION INTRAMUSCULAR; INTRAVENOUS; SUBCUTANEOUS EVERY 8 HOURS
Refills: 0 | Status: DISCONTINUED | OUTPATIENT
Start: 2021-10-04 | End: 2021-10-06

## 2021-10-04 RX ORDER — DIPHENHYDRAMINE HCL 50 MG
25 CAPSULE ORAL EVERY 6 HOURS
Refills: 0 | Status: DISCONTINUED | OUTPATIENT
Start: 2021-10-04 | End: 2021-10-06

## 2021-10-04 RX ORDER — INFLUENZA VIRUS VACCINE 15; 15; 15; 15 UG/.5ML; UG/.5ML; UG/.5ML; UG/.5ML
0.5 SUSPENSION INTRAMUSCULAR ONCE
Refills: 0 | Status: DISCONTINUED | OUTPATIENT
Start: 2021-10-04 | End: 2021-10-06

## 2021-10-04 RX ADMIN — Medication 1000 MILLIUNIT(S)/MIN: at 22:13

## 2021-10-04 RX ADMIN — Medication 30 MILLIGRAM(S): at 22:11

## 2021-10-04 NOTE — OB PROVIDER H&P - PROBLEM SELECTOR PLAN 1
- Admit to L & D/labs/IVF/NPO  - fetal status - cat 1  - GBS neg  - Pain control -desires epidural  - Expectant labor management, will start pitocin if needed  - Covid swab ordered  - consents to be signed  D/W Dr Ke Marroquin PA-C

## 2021-10-04 NOTE — OB RN PATIENT PROFILE - NS_PRENATALHARD_OBGYN_ALL_OB
Visit Type : Biopsy follow up     Records/Orders: pathology     Pt Contacted: no    At Rooming: Video    Available

## 2021-10-04 NOTE — CHART NOTE - NSCHARTNOTEFT_GEN_A_CORE
Called to evaluate pt in labor. Seen in trauma D. 38 weeks pregnant, . Amniotic fluid visible on stretcher. Head not visible upon external exam. Internal exam w/ crown of head palpated deep within vaginal vault. Pt cleared to go directly to L&D for expectant delivery.

## 2021-10-04 NOTE — OB RN DELIVERY SUMMARY - BABY A: APGAR 1 MIN COLOR, DELIVERY
01/05/21 0900   Daily Treatment   Symptoms Review Activity Group 0900 - 1000   Affect Flat   Mood Depressed;Flat   Thought Process Narrow   Psychosis Hallucinations (type)   Type of Hallucinations AH-10/10, VH-10/10   Symptom Notation Pt expressed feeling very unsettled this morning due to poor sleep.  Pt stated he is doing things to help with his sleep hygiene and noted that he continues to wake up throughout the night.    Psychosocial Stressors Academic/Career concerns;Interpersonal conflict;Loss / Grief   Sleep Report Difficulty falling asleep;Frequently awakening   Hours Slept 3   Meals Lunch   Goal Complete / Activity Pt stated that he completed his goal of making an insurance payment.    Treatment Plan Continue treatment plan   Patient Response Appropriate feedback;Attentive   Comment Pt completed symptom rating sheet   RN Monitoring of Pain, Safety, and Relapse   Safety Concerns Suicidal ideation;Homicidal thoughts;Urge to harm self   Types of Safety Concerns SI: 10/10; HI: 1/10; UHS: 9/10   Physical Concerns 10/10   Pain Concerns 10/10   Use of Street Drugs or Alcohol No   Taking Medications as Prescribed No  (forgot blood pressure medication)   Total in group: 9    JOSLYN Canchola    (0) blue, pale

## 2021-10-04 NOTE — OB PROVIDER H&P - ATTENDING COMMENTS
Multiparous patient at 37+ weeks with SROM and active labor  Clear fluid  Admitted at 6 cm/90%   Received an epidural  Cx currently 9/100/-1  FH with moderate variability  All questions answered

## 2021-10-04 NOTE — OB PROVIDER H&P - HISTORY OF PRESENT ILLNESS
31yo  @ 37w 6 d presents via EMS c/o contractions an dLOF since 6pm, denies VB has + FM    PNC: Dr Dempsey  PNI: uncomplilcated  PNL: GBS neg    All: PCN - unknown  Meds: PNV  PMHx: Denies  PSHx: Denies  Socialhx: Denies x 3  OBhx: 2016  36wks    5lbs 9oz uncomplicated  GYNhx: h/o fibroids, denies ov cysts or STDs      HR: 77 (10-04-21 @ 19:13) (77 - 78)  BP: 137/74 (10-04-21 @ 19:05) (137/74 - 137/74)  SpO2: 99% (10-04-21 @ 19:13) (99% - 99%)    Gen: NAd  Abdomen: Gravid, NT  Ext: no calf tenderness    NST: 125 moderate variablity   TOCO: q 2-3 min  VE: 6/90/-1  grossly rutpured lt mec  BSUS: vtx  EFW: 6lbs 12 oz on ultrasound last week

## 2021-10-04 NOTE — OB NEONATOLOGY/PEDIATRICIAN DELIVERY SUMMARY - NSPEDSNEONOTESA_OBGYN_ALL_OB_FT
Baby is a 37.6 wk GA female born to a 31 y/o  mother via . Maternal history uncomplicated. Prenatal history uncomplicated. Maternal BT O+. PNL neg, NR, and immune. GBS unknown, no antibiotics SROM at 1830 on 10/4, meconium. Baby born vigorous and crying spontaneously. WDSS. Apgars 9/9. EOS 0.17. Mom plans to breastfeed. HepB vaccine deferred. COVID status negative.    BW: 3631g  TOB: 2059  : 10/04/21    Physical Exam (Post-Delivery)  Gen: NAD; well-appearing  HEENT: NC/AT; anterior fontanelle open and flat; ears and nose clinically patent, normally set; no tags, no cleft palate appreciated  Skin: pink, warm, well-perfused, no rash  Resp: non-labored breathing  Abd: soft, NT/ND; no masses appreciated, umbilical cord with 3 vessels  Extremities: R foot deformity, R foot flexed and internally rotated, moving all extremities, no crepitus; hips negative O/B  MSK: no clavicular fracture appreciated  : Perfecto I; no abnormalities; anus patent  Back: no sacral dimple  Neuro: +paco, +babinski, grasp, good tone throughout

## 2021-10-04 NOTE — OB RN PATIENT PROFILE - HEALTH/HEALTHCARE ANXIETIES, PROFILE
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 Walsh as scheduled Manny Morrison  19M known unruptured L ICA aneurysm s/p DSA 1/14/2020 presents for gradual worsening headache since yesterday, has now resolved with tylenol, neurointact  - Will discuss possible admission for treatment none

## 2021-10-04 NOTE — OB RN DELIVERY SUMMARY - NSSELHIDDEN_OBGYN_ALL_OB_FT
[NS_DeliveryAttending1_OBGYN_ALL_OB_FT:[id number]],[NS_DeliveryRN_OBGYN_ALL_OB_FT:[id number]]

## 2021-10-04 NOTE — OB RN DELIVERY SUMMARY - NS_SEPSISRSKCALC_OBGYN_ALL_OB_FT
No temperature has been documented for this patient in CPN or on the OB Flowsheet. Ensure the highest temperature during labor was documented on the OB Flowsheet.

## 2021-10-05 LAB
COVID-19 SPIKE DOMAIN AB INTERP: NEGATIVE — SIGNIFICANT CHANGE UP
COVID-19 SPIKE DOMAIN ANTIBODY RESULT: 0.4 U/ML — SIGNIFICANT CHANGE UP
SARS-COV-2 IGG+IGM SERPL QL IA: 0.4 U/ML — SIGNIFICANT CHANGE UP
SARS-COV-2 IGG+IGM SERPL QL IA: NEGATIVE — SIGNIFICANT CHANGE UP
T PALLIDUM AB TITR SER: NEGATIVE — SIGNIFICANT CHANGE UP

## 2021-10-05 RX ORDER — IBUPROFEN 200 MG
600 TABLET ORAL EVERY 6 HOURS
Refills: 0 | Status: DISCONTINUED | OUTPATIENT
Start: 2021-10-05 | End: 2021-10-06

## 2021-10-05 RX ADMIN — Medication 1 TABLET(S): at 12:03

## 2021-10-05 RX ADMIN — SODIUM CHLORIDE 3 MILLILITER(S): 9 INJECTION INTRAMUSCULAR; INTRAVENOUS; SUBCUTANEOUS at 07:16

## 2021-10-05 RX ADMIN — Medication 975 MILLIGRAM(S): at 21:21

## 2021-10-05 RX ADMIN — Medication 600 MILLIGRAM(S): at 17:44

## 2021-10-05 RX ADMIN — Medication 600 MILLIGRAM(S): at 12:03

## 2021-10-05 RX ADMIN — Medication 975 MILLIGRAM(S): at 08:46

## 2021-10-05 RX ADMIN — Medication 600 MILLIGRAM(S): at 06:41

## 2021-10-05 RX ADMIN — Medication 975 MILLIGRAM(S): at 15:06

## 2021-10-05 NOTE — PROGRESS NOTE ADULT - ASSESSMENT
A/P: 31yo PPD#1 s/p .  Patient is stable and doing well post-partum.   - Pain well controlled, continue current pain regimen  - Increase ambulation, SCDs when not ambulating  - Continue regular diet    Jenny Waters, PGY1

## 2021-10-05 NOTE — PROGRESS NOTE ADULT - SUBJECTIVE AND OBJECTIVE BOX
OB Progress Note:  PPD#1    S: 31yo  PPD#1 s/p . Patient feels well. Pain is well controlled. She is tolerating a regular diet and passing flatus. She is voiding spontaneously, and ambulating without difficulty. Denies CP/SOB. Denies lightheadedness/dizziness. Denies N/V.    O:  Vitals:  Vital Signs Last 24 Hrs  T(C): 36.7 (04 Oct 2021 23:30), Max: 37.2 (04 Oct 2021 19:05)  T(F): 98 (04 Oct 2021 23:30), Max: 98.96 (04 Oct 2021 19:05)  HR: 68 (04 Oct 2021 23:00) (64 - 108)  BP: 126/66 (04 Oct 2021 23:00) (124/59 - 161/91)  BP(mean): 88 (04 Oct 2021 23:00) (85 - 99)  RR: 18 (04 Oct 2021 23:00) (18 - 18)  SpO2: 96% (04 Oct 2021 23:00) (92% - 100%)    MEDICATIONS  (STANDING):  acetaminophen   Tablet .. 975 milliGRAM(s) Oral <User Schedule>  diphtheria/tetanus/pertussis (acellular) Vaccine (ADAcel) 0.5 milliLiter(s) IntraMuscular once  ibuprofen  Tablet. 600 milliGRAM(s) Oral every 6 hours  influenza   Vaccine 0.5 milliLiter(s) IntraMuscular once  oxytocin Infusion 333.333 milliUNIT(s)/Min (1000 mL/Hr) IV Continuous <Continuous>  oxytocin Infusion 333.333 milliUNIT(s)/Min (1000 mL/Hr) IV Continuous <Continuous>  oxytocin Infusion. 4 milliUNIT(s)/Min (4 mL/Hr) IV Continuous <Continuous>  prenatal multivitamin 1 Tablet(s) Oral daily  sodium chloride 0.9% lock flush 3 milliLiter(s) IV Push every 8 hours      Labs:  Blood type: O Positive  Rubella IgG: RPR:                           9.9<L>   10.56<H> >-----------< 272    ( 10-04 @ 21:52 )             32.0<L>                  Physical Exam:  General: NAD  Abdomen: soft, non-tender, non-distended, fundus firm  Vaginal: Lochia wnl  Extremities: No erythema/edema

## 2021-10-06 ENCOUNTER — TRANSCRIPTION ENCOUNTER (OUTPATIENT)
Age: 31
End: 2021-10-06

## 2021-10-06 VITALS
HEART RATE: 72 BPM | DIASTOLIC BLOOD PRESSURE: 86 MMHG | SYSTOLIC BLOOD PRESSURE: 146 MMHG | TEMPERATURE: 98 F | OXYGEN SATURATION: 95 % | RESPIRATION RATE: 18 BRPM

## 2021-10-06 PROCEDURE — 86900 BLOOD TYPING SEROLOGIC ABO: CPT

## 2021-10-06 PROCEDURE — 85025 COMPLETE CBC W/AUTO DIFF WBC: CPT

## 2021-10-06 PROCEDURE — 86850 RBC ANTIBODY SCREEN: CPT

## 2021-10-06 PROCEDURE — 87635 SARS-COV-2 COVID-19 AMP PRB: CPT

## 2021-10-06 PROCEDURE — 59050 FETAL MONITOR W/REPORT: CPT

## 2021-10-06 PROCEDURE — 86769 SARS-COV-2 COVID-19 ANTIBODY: CPT

## 2021-10-06 PROCEDURE — 59025 FETAL NON-STRESS TEST: CPT

## 2021-10-06 PROCEDURE — 86780 TREPONEMA PALLIDUM: CPT

## 2021-10-06 PROCEDURE — 86901 BLOOD TYPING SEROLOGIC RH(D): CPT

## 2021-10-06 PROCEDURE — G0463: CPT

## 2021-10-06 PROCEDURE — 36415 COLL VENOUS BLD VENIPUNCTURE: CPT

## 2021-10-06 RX ORDER — IBUPROFEN 200 MG
1 TABLET ORAL
Qty: 0 | Refills: 0 | DISCHARGE
Start: 2021-10-06

## 2021-10-06 RX ORDER — ACETAMINOPHEN 500 MG
3 TABLET ORAL
Qty: 0 | Refills: 0 | DISCHARGE
Start: 2021-10-06

## 2021-10-06 RX ADMIN — Medication 600 MILLIGRAM(S): at 12:13

## 2021-10-06 RX ADMIN — Medication 600 MILLIGRAM(S): at 12:45

## 2021-10-06 RX ADMIN — Medication 1 TABLET(S): at 12:08

## 2021-10-06 RX ADMIN — Medication 600 MILLIGRAM(S): at 06:01

## 2021-10-06 RX ADMIN — Medication 975 MILLIGRAM(S): at 09:04

## 2021-10-06 RX ADMIN — Medication 975 MILLIGRAM(S): at 09:35

## 2021-10-06 NOTE — DISCHARGE NOTE OB - CARE PROVIDER_API CALL
Yair Dempsey J  OBSTETRICS AND GYNECOLOGY  [address] [place] [address]  Phone: [phone]  Fax: [phone]  Follow Up Time:

## 2021-10-06 NOTE — DISCHARGE NOTE OB - PATIENT PORTAL LINK FT
You can access the FollowMyHealth Patient Portal offered by [facility] by registering at the following website: [email or web address]. By joining [employer]’s FollowMyHealth portal, you will also be able to view your health information using other applications (apps) compatible with our system.

## 2021-10-06 NOTE — DISCHARGE NOTE OB - CARE PLAN
1 Principal Discharge DX:	Term birth of infant  Assessment and plan of treatment:	Office appt in 6 weeks / Regular diet and activity

## 2021-11-19 ENCOUNTER — TRANSCRIPTION ENCOUNTER (OUTPATIENT)
Age: 31
End: 2021-11-19

## 2021-11-24 ENCOUNTER — TRANSCRIPTION ENCOUNTER (OUTPATIENT)
Age: 31
End: 2021-11-24

## 2021-11-28 ENCOUNTER — TRANSCRIPTION ENCOUNTER (OUTPATIENT)
Age: 31
End: 2021-11-28

## 2022-09-01 NOTE — OB PROVIDER H&P - NSGENETICTESTING_OBGYN_ALL_OB
Not applicable Cephalexin Counseling: I counseled the patient regarding use of cephalexin as an antibiotic for prophylactic and/or therapeutic purposes. Cephalexin (commonly prescribed under brand name Keflex) is a cephalosporin antibiotic which is active against numerous classes of bacteria, including most skin bacteria. Side effects may include nausea, diarrhea, gastrointestinal upset, rash, hives, yeast infections, and in rare cases, hepatitis, kidney disease, seizures, fever, confusion, neurologic symptoms, and others. Patients with severe allergies to penicillin medications are cautioned that there is about a 10% incidence of cross-reactivity with cephalosporins. When possible, patients with penicillin allergies should use alternatives to cephalosporins for antibiotic therapy.

## 2023-01-23 NOTE — ED PROVIDER NOTE - CPE EDP EYES NORM
"[facility] - [facility]  Palliative Medicine  Progress Note    Patient Name: Paul Browning  MRN: [id number]  Admission Date: 1/16/2023  Hospital Length of Stay: 5 days  Code Status: DNR   Attending Provider: Santo Shankar MD  Consulting Provider: Marie Blanco MD  Primary Care Physician: Grey Forbes DO  Principal Problem:Closed fracture of multiple ribs of right side    Patient information was obtained from relative(s) and primary team.      Assessment/Plan:     Encounter for palliative care  Paul Browning (Joe) is an 83-year-old man with a history of dementia (FAST score 6D), CAD s/p STEMI/PCI/2v-CABG (2014), ICM with reduced EF (40%), COPD on home oxygen (intermittently), sarcoidosis, possible Parkinson's, DM2, CKD3, multiple falls who was admitted after sustaining a fall with multiple rib fractures and small apical pneumothorax. Hospital course notable for delirium/encephalopathy, concerns of aspiration, development of VIKTOR and hypotension. Palliative and Supportive Care was consulted to explore goals of care and symptom management recommendations.    Advance Care Planning   Goals of Care:  - Code status: DNAR/DNI, comfort focused care  - Next of kin: 4 adult children (3 sons Paul, Neto, Dennys, 1 daughter Mesha)  - Patient is not decisional  - ACP documents: none  - Prognosis: poor  - Family's understanding of prognosis: good  - Goals: control pain/symptoms, treat acute, reversible issues, continue supportive care; minimize unneccesary suffering   - Recommendations/Plans: transition to comfort focused care. Will utilize hospice support. Will de-escalate oxygen supplementation as dyspnea is controlled with medications. Priority is to be able to return home with hospice support. If symptom burden is dependent on continuous infusion of medication, family would be supportive of inpatient hospice transition    Goals of Care Conversation  - 1/23/2023: All of the children " arrived at bedside with their spouses and multiple other family members. I spoke with Mesha, Paul, Neto, and Dennys with their spouses, and they reached a consensus to focus on his comfort. They do not want him suffering, nor suffocating. Discussed with family about what comfort focused care would entail, with focus on alleviating pain/dyspnea/anxiety without hastening his natural dying process. Discussed that in the same spirit, we will not do anything to prolong his suffering. Discussed home hospice and inpatient hospice, with the prominent difference is the ability to use IV medications in inpatient hospice for symptom relief refractory to oral/transdermal/rectal medications. Family expressed wishes to bring him home for his final moments on earth. Thankfully Father Chase has come visited today. Family would like to proceed with comfort focused care, understanding that with medication to bring his respiratory rate closer to 12-20, we can also de-escalate his oxygen supplementation.   - 1/21: Please see separate note for GOC/ACP discussion.  Pt DNR/DNI in event of continued decline and respiratory/cardiac arrest.  Family wants to avoid invasive and non beneficial life prolonging therapies.  Cont best supportive and minimally invasive care for now.  If declines further, family would want focus to be entirely on comfort focused therapies.   - 1/20/2023: Paul at bedside, who had spent the night with Mr. Browning. Remains concerns of worsening encephalopathy and raised question to the physicians about potential UTI exacerbating mental status. Remains hopeful that his numbers are looking stable/OK - his blood pressure, oxygen saturation, labs, imaging. Acknowledged that he is still very tachypneic and looks worse than he did on admission. His sister Mesha will come today and family will always remain at bedside throughout Mr. Browning's hospitalization. Remains open to palliative presence during   "Nallely's hospitalization for continued support.  - 1/19/2023: Met Mr. Browning with his daughter Mesha and son Dennys at bedside. Mesha described overnight events, noting that while pain is better, he was up all night, confused and required dose of Zyprexa. She confirms that he does not have Parkinson's and that tremor is due to chronic nerve damage, for which he takes gabapentin. Noted that despite being in the hospital and having better control of pain, he appears to be getting worse. Mesha agrees that he looks worse, but shares that her brother Paul checked MyChart and saw that there were no drastic changes in his imaging and labs. I shared that I worried about him aspirating, noting that there is an increasing right lower lobe infiltrate demonstrated in his morning's chest x-ray. She wants to know what it is, whether it is a pneumonia and would like antibiotics started sooner than later to treat it. Emphasized our last conversation that it was not a matter of if he'd aspirate, but when he would, and that I worried time is short. She acknowledges that when he fell and was in the ER, that there was a chance he would "not make it" but wants to give him a chance of surviving this. Shared that I will revisit this afternoon to see how he and they are doing. Offered that if they'd like us to engage other family members (such as Dennys's wife who is an RN) that we'd be happy to do so per their preference. Mesha asks if Father Chase can come, shared that he did not come at all yesterday according to Paul. I will reach out and request for Father Chase to come to Mr. Browning today.  - 1/18/2023: Met Mr. Browning and his son Paul at his bedside, who was having a discussion related to code status with a surgeon on the team. Mr. Browning agrees that CPR will cause more harm and will text and discuss with his siblings about this code status. At this time he remains hesitant about changing code " status related to intubation, recalling that his mother survived compassionate extubation 3 years after physicians told them that she was dying. Had supportive conversation with Paul, who remains hopeful that pain management will be able to perform nerve block as he worries that the opioids are causing too much sedation and would like to minimize medications if possible. Discussed interval development of VIKTOR, and will be watchful of this problem and adjust medications to dose renally and protect kidneys.  - 2023: Met Mr. Browning and his daughter Mesha and son Paul at his bedside. Mr. Browning is unable to fully participate in our discussion due to advanced dementia and immediate short term memory loss. Mesha shares her understanding that he broke 4 ribs in this fall and punctured his lungs. She was told that he might need a chest tube and he was high risk for developing pneumonia. She recalls that her mother and father discussed with each other that they would not want to be artificially sustained on life-support. When her mother was intubated and told that she would be ventilator-dependent, family honored her wishes and withdrew ventilator support. She thankfully lived 3 years after compassionate extubation and  3 years ago, which was the beginning of Mr. Browning's decline. They had been  when he was 16-years-old and she was 15-years-old. Now he lives with his daughter Mesha and is also supported by his other sons who help with other important aspects of his life. Mesha shares that he has sustained multiple falls, most recently sustained 3 falls in the last 2 weeks. Despite constant reorientation, he forgets to stay in bed and will try to get out and ambulate. He had significant sundowning from 4pm to 8pm, but now it is all day. Mesha and Paul recognize that he is progressively declining and acknowledge that this will continue. They've been told previously when he sustained  "his prior falls that "this was it" but despite this, he continues to live so they hope to continue doing what they can to help him live. When Mesha discussed code status in the ER, she was under the impression that the ER staff was saying that intubation and CPR will help him. We discussed that knowing that the fall was enough to cause multiple rib fractures, forceful chest compressions will surely cause more harm and more breakage, and the ventilator will worsen his small pneumothorax. I recommended against such interventions, explaining that this will sure cause his demise and not help him the way we hope. Mesha asks if we don't intubate/ventilate when he develops a pneumonia, what can be done alternatively. I shared that at that point there are no curative interventions, and that medical professionals can support him with symptom management and offer him strategies to alleviate shortness of breath. They asked about chest tube, and I shared that as Neto (their brother) had a chest tube before, that Mr. Browning will also have significant pain if a chest tube is inserted, and this would prolong his current state without a guarantee that it will fix the pneumothorax as his body will have a harder time to heal. They share their understanding and would like to discuss this with their other siblings. Their sister-in-law who is an [facility] RN came to visit, and also voiced similar concerns about CPR/intubation as well. Mesha states that she is worried that he is miserable, forgetting immediately that he already went out to visit family/loved ones and stating repetitively that he wishes to go out to fish. Validated her concerns, recognizing that they wish that everything helpful be continued and offered. Encouraged them to consider that in addition to this, it is also our responsibility to protect him from harmful interventions that will not benefit him, as they had done so for their mother.      Acute Pain " 2/2 Rib Fractures, Falls  Dyspnea  - Hydromorphone 0.5 mg IV q1h PRN pain, labored breathing  - Midazolam 1 mg IV q1h PRN dyspnea refractory to opioids, anxiety  - De-escalate oxygen as tolerated once respiratory rate improves (closer to 12-20) with opioids  - Gabapentin 500 mg PO daily (adjusted from home 300 mg TID schedule for VIKTOR) if able to tolerate PO  - Continue lidocaine patches  - Continue budesonide BID, levalbuterol q4h PRN wheezing  - Avoid IV fluids to avoid volume overload    Profuse Oropharyngeal Secretions  - Discontinued scopolamine patch due to worsening mental status/agitation  - Turn head as needed to help shift oropharyngeal secretions out of the airway  - Yankauer suction at bedside  - Start glycopyrrolate 0.2 mg IV q4h PRN profuse oropharyngeal secretions    Constipation  - Continue bowel regimen  - Discontinued docusate  - Restart senna 8.6 mg PO BID if able to tolerate PO  - Polyethylene glycol 17 g PO BID if able to tolerate PO  - Ideally would avoid suppository/enema as turning causes more pain, but may need to do so if no bowel movement    Anxiety/Agitation/[place]/Delirium  Dementia with Behavioral Disturbances  - Discontinue memantine 10 mg PO BID   - Continue sertraline, will initiate taper at 75 mg as he may not be able to tolerate PO  - Clarified that he does not have Parkinson's  - Continue frequent reorientation and optimization of environment by keeping room bright during the day, dark and quiet at night  - Vital signs qshift  - Optimize pain relief as above  - Midazolam 1 mg IV q1h PRN anxiety, dyspnea refractory to opioids  - Haloperidol 1 mg IV q6h PRN non-redirectable agitation  - Discontinue scopolamine patch, which can further worsen agitation              I will follow-up with patient. Please contact us if you have any additional questions.    Subjective:     Chief Complaint:   Chief Complaint   Patient presents with    Fall     Mechanical fall to R side while walking  "to bathroom. Fell yesterday also onto same side. Hx of dementia. Fall witnessed by pt son and reports that pt hit head tonight. -blood thinners. No deformity, bruising noted to R lower back from yesterdays fall per son.        HPI:   Paul Browning (Joe) is an 83-year-old man with a history of dementia (FAST score 6D), CAD s/p STEMI/PCI/2v-CABG (2014), ICM with reduced EF (40%), COPD on home oxygen (2 L/min), sarcoidosis, possible Parkinson's, DM2, CKD3, multiple falls who was admitted after sustaining a fall with multiple rib fractures and small apical pneumothorax. Palliative and Supportive Care was consulted to explore goals of care and symptom management recommendations.    I met Mr. Browning, who has both chronic visual and auditory deficits. He asks when will he be able to go home. He is immediately forgetting what we just discussed during our conversation. I met with his daughter Mesha at his bedside. Please see assessment/recommendations for details of conversation.      Hospital Course:  No notes on file    Interval History: Requiring much more oxygen support, on HFNC and non-rebreather. Family came together and agreed to focus on his comfort, requesting for transition to happen as soon as possible.    Medications:  Continuous Infusions:   dexmedetomidine (PRECEDEX) infusion 1.3 mcg/kg/hr (01/23/23 1100)    dextrose 5 % and 0.45 % NaCl 50 mL/hr at 01/23/23 1100     Scheduled Meds:   budesonide  0.5 mg Nebulization Q12H    dronabinoL  2.5 mg Oral BID    furosemide  20 mg Per NG tube Daily    gabapentin  250 mg Per NG tube Q12H    levalbuterol  0.63 mg Nebulization Q4H    LIDOcaine  1 patch Transdermal Q24H    methocarbamol (ROBAXIN) IVPB  500 mg Intravenous Q8H    pantoprazole  40 mg Per NG tube Daily    polyethylene glycol  17 g Per NG tube BID    sertraline  100 mg Per NG tube Daily     PRN Meds:sodium chloride 0.9%, albuterol-ipratropium, haloperidol lactate, HYDROmorphone, " midazolam, ondansetron, oxyCODONE    Objective:     Vital Signs (Most Recent):  Temp: 98 °F (36.7 °C) (23 0700)  Pulse: 70 (23 1152)  Resp: (!) 33 (23 1152)  BP: (!) 90/52 (23 1100)  SpO2: 97 % (23 1152)   Vital Signs (24h Range):  Temp:  [97.8 °F (36.6 °C)-99.3 °F (37.4 °C)] 98 °F (36.7 °C)  Pulse:  [] 70  Resp:  [28-49] 33  SpO2:  [60 %-100 %] 97 %  BP: ()/(51-71) 90/52     Weight:  (bed scale broken)  Body mass index is 22.15 kg/m².    Physical Exam  Constitutional:       General: He is in acute distress.      Appearance: He is ill-appearing.   HENT:      Head: Normocephalic and atraumatic.      Right Ear: External ear normal.      Left Ear: External ear normal.      Nose: Nose normal.      Mouth/Throat:      Mouth: Mucous membranes are dry.   Eyes:      Conjunctiva/sclera: Conjunctivae normal.   Cardiovascular:      Rate and Rhythm: Normal rate.   Pulmonary:      Effort: Respiratory distress present.      Breath sounds: Wheezing and rales present.   Abdominal:      General: Bowel sounds are normal. There is no distension.   Musculoskeletal:      Right lower leg: No edema.      Left lower leg: No edema.   Lymphadenopathy:      Cervical: No cervical adenopathy.   Skin:     General: Skin is dry.      Coloration: Skin is pale.      Findings: Bruising present.   Neurological:      Mental Status: He is disoriented.      Motor: Weakness present.       Review of Symptoms      Symptom Assessment (ESAS 0-10 Scale)  Unable to complete assessment due to Mental status change         Pain Assessment in Advanced Demential Scale (PAINAD)   Breathing - Independent of vocalization:  2  Negative vocalization:  1  Facial expression:  1  Body language:  1  Consolability:  1  Total:  6    Psychosocial/Cultural:   See Palliative Psychosocial Note: No  Social Issues Identified: Coping deficit pt/family  Bereavement Risk: Yes: Close or dependent relationship to the  person  Caregiver  Needs Discussed. Caregiver Distress: Yes: Intensity of family caregiving  **Primary  to Follow**  Palliative Care  Consult: No    Spiritual:  F - Deja and Belief:  Taoism  I - Importance:  Very important  C - Community:  Involved  A - Address in Care:  Father Chase came to pray      Advance Care Planning   Advance Directives:   Living Will: No    LaPOST: No    Do Not Resuscitate Status: Yes    Medical Power of : No      Decision Making:  Family answered questions and Patient unable to communicate due to disease severity/cognitive impairment  Goals of Care: The family endorses that what is most important right now is to focus on comfort and QOL     Accordingly, we have decided that the best plan to meet the patient's goals includes enrolling in hospice care         Significant Labs: CBC:   Recent Labs   Lab 01/22/23 0255 01/22/23 2118 01/23/23 0245   WBC 5.72 5.13 6.04   HGB 8.7* 8.6* 9.2*   HCT 26.5* 27.4* 30.4*    200 211     CMP:   Recent Labs   Lab 01/22/23 0255 01/23/23 0245    143   K 4.6 4.5    110   CO2 21* 22*   * 139*   BUN 32* 35*   CREATININE 0.9 0.8   CALCIUM 8.4* 9.0   PROT 5.2* 6.1   ALBUMIN 2.6* 2.6*   BILITOT 0.9 1.0   ALKPHOS 137* 181*   AST 24 33   ALT 26 29   ANIONGAP 11 11     CBC:   Recent Labs   Lab 01/23/23 0245   WBC 6.04   HGB 9.2*   HCT 30.4*   *        BMP:  Recent Labs   Lab 01/23/23 0245   *      K 4.5      CO2 22*   BUN 35*   CREATININE 0.8   CALCIUM 9.0   MG 2.6     LFT:  Lab Results   Component Value Date    AST 33 01/23/2023    ALKPHOS 181 (H) 01/23/2023    BILITOT 1.0 01/23/2023     Albumin:   Albumin   Date Value Ref Range Status   01/23/2023 2.6 (L) 3.5 - 5.2 g/dL Final     Protein:   Total Protein   Date Value Ref Range Status   01/23/2023 6.1 6.0 - 8.4 g/dL Final     Lactic acid:   Lab Results   Component Value Date    LACTATE 1.0 06/06/2022    LACTATE 0.8 11/17/2014        Significant Imaging: I have reviewed all pertinent imaging results/findings within the past 24 hours.    I spent a total of 60 minutes on the day of the visit.This includes face to face time in discussion of goals of care, symptom assessment, coordination of care and emotional support.  This also includes non-face to face time preparing to see the patient (eg, review of tests/imaging), obtaining and/or reviewing separately obtained history, documenting clinical information in the electronic or other health record, independently interpreting results and communicating results to the patient/family/caregiver, or care coordinator.      Marie Blanco MD  Palliative Medicine  [facility] - Surgical Intensive Care               normal...

## 2023-02-28 ENCOUNTER — NON-APPOINTMENT (OUTPATIENT)
Age: 33
End: 2023-02-28

## 2023-04-27 NOTE — ED ADULT NURSE NOTE - OBJECTIVE STATEMENT
28yo female, A&Ox3, with no pmh presents with gradual onset of abd pain and constipation x3 days. Pt also reports fever 100.5 at home. Covid test pending from urgent care. Denies sick contacts. Pain improves with Tylenol and eating. +flatus. +nausea without vomiting. Denies urinary symptoms. LMP 3 weeks ago. On exam pt c/o left sided abd ttp. Detail Level: Detailed

## 2023-11-12 ENCOUNTER — NON-APPOINTMENT (OUTPATIENT)
Age: 33
End: 2023-11-12

## 2024-01-08 ENCOUNTER — NON-APPOINTMENT (OUTPATIENT)
Age: 34
End: 2024-01-08

## 2024-01-24 PROBLEM — Z78.9 OTHER SPECIFIED HEALTH STATUS: Chronic | Status: ACTIVE | Noted: 2021-10-04

## 2024-02-01 ENCOUNTER — LABORATORY RESULT (OUTPATIENT)
Age: 34
End: 2024-02-01

## 2024-02-01 ENCOUNTER — APPOINTMENT (OUTPATIENT)
Dept: RHEUMATOLOGY | Facility: CLINIC | Age: 34
End: 2024-02-01
Payer: MEDICAID

## 2024-02-01 VITALS
OXYGEN SATURATION: 99 % | DIASTOLIC BLOOD PRESSURE: 63 MMHG | HEART RATE: 58 BPM | SYSTOLIC BLOOD PRESSURE: 105 MMHG | HEIGHT: 67 IN | BODY MASS INDEX: 21.35 KG/M2 | WEIGHT: 136 LBS

## 2024-02-01 DIAGNOSIS — R21 RASH AND OTHER NONSPECIFIC SKIN ERUPTION: ICD-10-CM

## 2024-02-01 DIAGNOSIS — R76.8 OTHER SPECIFIED ABNORMAL IMMUNOLOGICAL FINDINGS IN SERUM: ICD-10-CM

## 2024-02-01 DIAGNOSIS — M25.50 PAIN IN UNSPECIFIED JOINT: ICD-10-CM

## 2024-02-01 LAB
ALBUMIN SERPL ELPH-MCNC: 4 G/DL
ALP BLD-CCNC: 66 U/L
ALT SERPL-CCNC: 14 U/L
ANION GAP SERPL CALC-SCNC: 9 MMOL/L
APPEARANCE: CLEAR
APTT BLD: 29.6 SEC
AST SERPL-CCNC: 24 U/L
BACTERIA: ABNORMAL /HPF
BASOPHILS # BLD AUTO: 0.01 K/UL
BASOPHILS NFR BLD AUTO: 0.2 %
BILIRUB SERPL-MCNC: 0.5 MG/DL
BILIRUBIN URINE: NEGATIVE
BLOOD URINE: NEGATIVE
BUN SERPL-MCNC: 10 MG/DL
CALCIUM SERPL-MCNC: 8.7 MG/DL
CAST: 1 /LPF
CHLORIDE SERPL-SCNC: 102 MMOL/L
CK SERPL-CCNC: 338 U/L
CO2 SERPL-SCNC: 24 MMOL/L
COLOR: YELLOW
CREAT SERPL-MCNC: 0.67 MG/DL
CREAT SPEC-SCNC: 65 MG/DL
CREAT/PROT UR: 0.1 RATIO
EGFR: 118 ML/MIN/1.73M2
EOSINOPHIL # BLD AUTO: 0.02 K/UL
EOSINOPHIL NFR BLD AUTO: 0.4 %
EPITHELIAL CELLS: 4 /HPF
GLUCOSE QUALITATIVE U: NEGATIVE MG/DL
HCT VFR BLD CALC: 35.1 %
HGB BLD-MCNC: 11 G/DL
IMM GRANULOCYTES NFR BLD AUTO: 0.2 %
INR PPP: 1.05 RATIO
KETONES URINE: NEGATIVE MG/DL
LEUKOCYTE ESTERASE URINE: NEGATIVE
LYMPHOCYTES # BLD AUTO: 2.51 K/UL
LYMPHOCYTES NFR BLD AUTO: 48.5 %
MAN DIFF?: NORMAL
MCHC RBC-ENTMCNC: 27.1 PG
MCHC RBC-ENTMCNC: 31.3 GM/DL
MCV RBC AUTO: 86.5 FL
MICROSCOPIC-UA: NORMAL
MONOCYTES # BLD AUTO: 0.41 K/UL
MONOCYTES NFR BLD AUTO: 7.9 %
NEUTROPHILS # BLD AUTO: 2.21 K/UL
NEUTROPHILS NFR BLD AUTO: 42.8 %
NITRITE URINE: NEGATIVE
PH URINE: 8
PLATELET # BLD AUTO: 315 K/UL
POTASSIUM SERPL-SCNC: 4.2 MMOL/L
PROT SERPL-MCNC: 7.8 G/DL
PROT UR-MCNC: 6 MG/DL
PROTEIN URINE: NEGATIVE MG/DL
PT BLD: 11.8 SEC
RBC # BLD: 4.06 M/UL
RBC # FLD: 13.8 %
RED BLOOD CELLS URINE: 1 /HPF
RHEUMATOID FACT SER QL: <10 IU/ML
SODIUM SERPL-SCNC: 135 MMOL/L
SPECIFIC GRAVITY URINE: 1.01
UROBILINOGEN URINE: 0.2 MG/DL
WBC # FLD AUTO: 5.17 K/UL
WHITE BLOOD CELLS URINE: 0 /HPF

## 2024-02-01 PROCEDURE — 99204 OFFICE O/P NEW MOD 45 MIN: CPT

## 2024-02-01 NOTE — REASON FOR VISIT
[Consultation] : a consultation visit [FreeTextEntry1] : fam FONTANA; Moses Perkins MD  [address] [place]  [address]

## 2024-02-01 NOTE — HISTORY OF PRESENT ILLNESS
[FreeTextEntry1] : 1.  Rash, ADDI positive: In the summer 2023 she noticed a rash on the ears as well as the nose. The lesion on the nose was biopsied, but according to the patient the result was a normal one. In addition, she has had some minor joint pains in the hands and knees. Mild hair loss has been noted. No internal organ involvement.  Her lab tests performed in early Jan 2024 were notable for ADDI 1/320.  2.  OB history:      a. Pregnancy No. 1: 2016: 4 weeks premature but no overt complications; vaginal delivery      b. Pregnancy No. 2: 2021: full term vaginal delivery 3.  Family history: sister diagnosed with cutaneous lupus  Meds none

## 2024-02-01 NOTE — ASSESSMENT
[FreeTextEntry1] : 1.  Rash, ADDI positive: In the summer 2023 she noticed a rash on the ears as well as the nose. The lesion on the nose was biopsied, but according to the patient the result was a normal one. In addition, she has had some minor joint pains in the hands and knees. Mild hair loss has been noted. No internal organ involvement.  Her lab tests performed in early Jan 2024 were notable for ADDI 1/320.  2.  OB history:      a. Pregnancy No. 1: 2016: 4 weeks premature but no overt complications; vaginal delivery      b. Pregnancy No. 2: 2021: full term vaginal delivery 3.  Family history: sister diagnosed with cutaneous lupus  Plan Lab tests Reviewed internal and/or external records of other providers Contact me

## 2024-02-01 NOTE — PHYSICAL EXAM
[General Appearance - Alert] : alert [General Appearance - In No Acute Distress] : in no acute distress [General Appearance - Well Nourished] : well nourished [General Appearance - Well-Appearing] : healthy appearing [Sclera] : the sclera and conjunctiva were normal [Outer Ear] : the ears and nose were normal in appearance [Neck Appearance] : the appearance of the neck was normal [Neck Cervical Mass (___cm)] : no neck mass was observed [Jugular Venous Distention Increased] : there was no jugular-venous distention [Thyroid Diffuse Enlargement] : the thyroid was not enlarged [Auscultation Breath Sounds / Voice Sounds] : lungs were clear to auscultation bilaterally [Heart Rate And Rhythm] : heart rate was normal and rhythm regular [Heart Sounds] : normal S1 and S2 [Heart Sounds Gallop] : no gallops [Full Pulse] : the pedal pulses are present [Edema] : there was no peripheral edema [Abdomen Soft] : soft [Abdomen Tenderness] : non-tender [Cervical Lymph Nodes Enlarged Posterior Bilaterally] : posterior cervical [Cervical Lymph Nodes Enlarged Anterior Bilaterally] : anterior cervical [Supraclavicular Lymph Nodes Enlarged Bilaterally] : supraclavicular [No CVA Tenderness] : no ~M costovertebral angle tenderness [No Spinal Tenderness] : no spinal tenderness [Abnormal Walk] : normal gait [Nail Clubbing] : no clubbing  or cyanosis of the fingernails [Musculoskeletal - Swelling] : no joint swelling seen [Motor Tone] : muscle strength and tone were normal [] : no rash [Sensation] : the sensory exam was normal to light touch and pinprick [Motor Exam] : the motor exam was normal [Oriented To Time, Place, And Person] : oriented to person, place, and time [Impaired Insight] : insight and judgment were intact [Affect] : the affect was normal

## 2024-02-02 LAB
C3 SERPL-MCNC: 98 MG/DL
C4 SERPL-MCNC: 15 MG/DL
CONFIRM: 33.2 SEC
DAT POLY: ABNORMAL
DRVVT IMM 1:2 NP PPP: NORMAL
DRVVT SCREEN TO CONFIRM RATIO: 0.85 RATIO
SCREEN DRVVT: 33.3 SEC
SILICA CLOTTING TIME INTERPRETATION: NORMAL
SILICA CLOTTING TIME S/C: 0.82 RATIO

## 2024-02-03 LAB
B2 GLYCOPROT1 IGA SERPL IA-ACNC: <5 SAU
B2 GLYCOPROT1 IGG SER-ACNC: <5 SGU
B2 GLYCOPROT1 IGM SER-ACNC: <5 SMU
CARDIOLIPIN IGM SER-MCNC: <5 GPL
CARDIOLIPIN IGM SER-MCNC: <5 MPL
DEPRECATED CARDIOLIPIN IGA SER: <5 APL

## 2024-02-04 LAB
ENA RNP AB SER IA-ACNC: 5.4 AL
ENA SM AB SER IA-ACNC: 0.3 AL
ENA SS-A AB SER IA-ACNC: <0.2 AL
ENA SS-B AB SER IA-ACNC: <0.2 AL
THYROGLOB AB SERPL-ACNC: 52.2 IU/ML
THYROPEROXIDASE AB SERPL IA-ACNC: 94.3 IU/ML

## 2024-02-05 LAB
CCP AB SER IA-ACNC: <8 UNITS
RF+CCP IGG SER-IMP: NEGATIVE

## 2024-02-06 LAB
DSDNA AB SER-ACNC: <12 IU/ML
PS IGA SER QL: 1
PS IGG SER QL: 16 UNITS
PS IGM SER QL: 14 UNITS

## 2024-02-09 LAB
PS-PROTHROM CMPLX IGG SERPL IA-ACNC: 14 UNITS
PS-PROTHROM CMPLX IGM SERPL IA-ACNC: 13 UNITS

## 2024-03-23 ENCOUNTER — NON-APPOINTMENT (OUTPATIENT)
Age: 34
End: 2024-03-23

## 2024-10-22 ENCOUNTER — NON-APPOINTMENT (OUTPATIENT)
Age: 34
End: 2024-10-22
